# Patient Record
Sex: MALE | Race: WHITE | NOT HISPANIC OR LATINO | Employment: OTHER | URBAN - METROPOLITAN AREA
[De-identification: names, ages, dates, MRNs, and addresses within clinical notes are randomized per-mention and may not be internally consistent; named-entity substitution may affect disease eponyms.]

---

## 2017-07-18 ENCOUNTER — TELEPHONE (OUTPATIENT)
Dept: GASTROENTEROLOGY | Facility: CLINIC | Age: 57
End: 2017-07-18

## 2017-07-18 NOTE — TELEPHONE ENCOUNTER
Patient called for an appointment. I reminded him that we referred him to [facility] in December. I gave the patient the contact information for [facility] again.

## 2019-09-18 ENCOUNTER — OFFICE VISIT (OUTPATIENT)
Dept: FAMILY MEDICINE CLINIC | Facility: CLINIC | Age: 59
End: 2019-09-18

## 2019-09-18 VITALS
BODY MASS INDEX: 21.95 KG/M2 | DIASTOLIC BLOOD PRESSURE: 74 MMHG | WEIGHT: 144.8 LBS | HEART RATE: 109 BPM | SYSTOLIC BLOOD PRESSURE: 148 MMHG | HEIGHT: 68 IN | OXYGEN SATURATION: 98 % | TEMPERATURE: 98.5 F

## 2019-09-18 DIAGNOSIS — K62.89 RECTAL PAIN: Primary | ICD-10-CM

## 2019-09-18 DIAGNOSIS — I10 ESSENTIAL HYPERTENSION: ICD-10-CM

## 2019-09-18 PROCEDURE — 99214 OFFICE O/P EST MOD 30 MIN: CPT | Performed by: NURSE PRACTITIONER

## 2019-09-18 NOTE — PROGRESS NOTES
Subjective   Chay Gallo Jr. is a 59 y.o. male.     Chief Complaint   Patient presents with   • Hypertension     This is my first time seeing this patient.   Rectal Pain   The current episode started more than 1 year ago. Pertinent negatives include no change in bowel habit, chest pain, fever, nausea or vomiting. Nothing aggravates the symptoms. He has tried nothing for the symptoms.        Hypertension: Patient here for evaluation of essential hypertension. Blood pressure is not well controlled at home.He is exercising and is not adherent to low salt diet. Home monitoring: The patient is not checking blood pressure at home. Symptoms: none. Medications: The patient is not adherent with their medication regimen. Medication(s): ACE Inhibitor. Patient states he is unable to swallow tablets or capsules. The patient is due for Lipid panel and Serum creatinine.          The following portions of the patient's history were reviewed and updated as appropriate: allergies, current medications, past family history, past medical history, past social history, past surgical history and problem list.    Past Medical History:   Diagnosis Date   • Allergic rhinitis    • Anxiety    • Benign hypertension    • Bipolar disorder (CMS/HCC)    • BPH (benign prostatic hyperplasia)    • Bright red rectal bleeding    • Cigarette nicotine dependence    • Depression    • Dysuria    • Frequency of urination    • GERD (gastroesophageal reflux disease)    • Hemorrhoids    • High blood pressure    • Hypertension    • IBS (irritable bowel syndrome)    • Liver pain    • Migraines    • Prostate disease    • Sleep apnea        History reviewed. No pertinent surgical history.    Family History   Problem Relation Age of Onset   • Hypertension Mother    • Hypertension Father    • Heart disease Other         FH of heart disease in males before age 55       Social History     Socioeconomic History   • Marital status: Unknown     Spouse name: Not on file  "  • Number of children: Not on file   • Years of education: Not on file   • Highest education level: Not on file   Tobacco Use   • Smoking status: Current Every Day Smoker     Packs/day: 0.50     Types: Cigarettes   • Smokeless tobacco: Never Used   Substance and Sexual Activity   • Alcohol use: No   • Drug use: No       Review of Systems   Constitutional: Negative for fever.   Cardiovascular: Negative for chest pain and leg swelling.   Gastrointestinal: Positive for rectal pain. Negative for change in bowel habit, nausea and vomiting.       Objective   Vitals:    09/18/19 1110   BP: 148/74   BP Location: Left arm   Patient Position: Sitting   Pulse: 109   Temp: 98.5 °F (36.9 °C)   SpO2: 98%   Weight: 65.7 kg (144 lb 12.8 oz)   Height: 171.5 cm (67.5\")      Body mass index is 22.34 kg/m².  Physical Exam   Constitutional: He appears well-developed and well-nourished.   Cardiovascular: Normal rate, regular rhythm and normal heart sounds.   Pulmonary/Chest: Effort normal and breath sounds normal.   Abdominal: Soft. Bowel sounds are normal. There is no tenderness.   Genitourinary:   Genitourinary Comments: Declined    Psychiatric: He has a normal mood and affect.   Nursing note and vitals reviewed.        Assessment/Plan   Chay was seen today for hypertension.    Diagnoses and all orders for this visit:    Rectal pain  -     Ambulatory Referral to Gastroenterology    Essential hypertension  -     Comprehensive Metabolic Panel  -     Lipid Panel With / Chol / HDL Ratio  -     Lisinopril 1 MG/ML solution; Take 10 mg by mouth Daily.               "

## 2019-09-19 ENCOUNTER — TELEPHONE (OUTPATIENT)
Dept: FAMILY MEDICINE CLINIC | Facility: CLINIC | Age: 59
End: 2019-09-19

## 2019-09-19 LAB
ALBUMIN SERPL-MCNC: 5 G/DL (ref 3.5–5.2)
ALBUMIN/GLOB SERPL: 1.9 G/DL
ALP SERPL-CCNC: 65 U/L (ref 39–117)
ALT SERPL-CCNC: 11 U/L (ref 1–41)
AST SERPL-CCNC: 15 U/L (ref 1–40)
BILIRUB SERPL-MCNC: 0.5 MG/DL (ref 0.2–1.2)
BUN SERPL-MCNC: 6 MG/DL (ref 6–20)
BUN/CREAT SERPL: 6.4 (ref 7–25)
CALCIUM SERPL-MCNC: 9.7 MG/DL (ref 8.6–10.5)
CHLORIDE SERPL-SCNC: 101 MMOL/L (ref 98–107)
CHOLEST SERPL-MCNC: 188 MG/DL (ref 0–200)
CHOLEST/HDLC SERPL: 3.55 {RATIO}
CO2 SERPL-SCNC: 26.6 MMOL/L (ref 22–29)
CREAT SERPL-MCNC: 0.94 MG/DL (ref 0.76–1.27)
GLOBULIN SER CALC-MCNC: 2.6 GM/DL
GLUCOSE SERPL-MCNC: 111 MG/DL (ref 65–99)
HDLC SERPL-MCNC: 53 MG/DL (ref 40–60)
LDLC SERPL CALC-MCNC: 121 MG/DL (ref 0–100)
POTASSIUM SERPL-SCNC: 5.4 MMOL/L (ref 3.5–5.2)
PROT SERPL-MCNC: 7.6 G/DL (ref 6–8.5)
SODIUM SERPL-SCNC: 143 MMOL/L (ref 136–145)
TRIGL SERPL-MCNC: 70 MG/DL (ref 0–150)
VLDLC SERPL CALC-MCNC: 14 MG/DL

## 2019-09-19 NOTE — TELEPHONE ENCOUNTER
Spoke with patient and informed him that we have placed a referral for him to see the GI doc.  If the bleeding is getting worse he would need to go to the ER.  Patient voiced and verbalized understanding.

## 2019-09-20 ENCOUNTER — TELEPHONE (OUTPATIENT)
Dept: FAMILY MEDICINE CLINIC | Facility: CLINIC | Age: 59
End: 2019-09-20

## 2019-09-20 DIAGNOSIS — R73.9 HYPERGLYCEMIA: ICD-10-CM

## 2019-09-20 DIAGNOSIS — E78.5 HYPERLIPIDEMIA, UNSPECIFIED HYPERLIPIDEMIA TYPE: Primary | ICD-10-CM

## 2019-09-20 DIAGNOSIS — E87.5 HYPERKALEMIA: ICD-10-CM

## 2019-09-20 RX ORDER — ATORVASTATIN CALCIUM 20 MG/1
20 TABLET, FILM COATED ORAL DAILY
Qty: 90 TABLET | Refills: 0 | Status: SHIPPED | OUTPATIENT
Start: 2019-09-20

## 2019-09-20 NOTE — TELEPHONE ENCOUNTER
Since fasting status is unknown, will draw A1c in 2 weeks.  Kidney function tests are normal.  Potassium is elevated at 5.4 so will repeat potassium level in 2 weeks.  Please decrease potassium rich foods and any potassium supplementation.  If potassium level remains elevated, then may need to switch antihypertensive medication.  Liver function tests are normal. The 10-year CVD risk score (TALISHA'Teriino, et al., 2008) is: 34.2%    Values used to calculate the score:      Age: 59 years      Sex: Male      Diabetic: No      Tobacco smoker: Yes      Systolic Blood Pressure: 148 mmHg      Is BP treated: Yes      HDL Cholesterol: 53 mg/dL      Total Cholesterol: 188 mg/dL  Given 10-year CVD risk score of 34.2% would recommend starting statin therapy.    Patient aware of results and recommendations.

## 2019-09-23 ENCOUNTER — TELEPHONE (OUTPATIENT)
Dept: OTHER | Facility: OTHER | Age: 59
End: 2019-09-23

## 2019-09-23 NOTE — TELEPHONE ENCOUNTER
Patient called in regards to follow up on a potential referral. Please return call and advise.    Requested to speak with a Jena.    Please return call as soon as possible.

## 2019-09-24 NOTE — TELEPHONE ENCOUNTER
It is easiest to reach patient in the morning or later in the evening for his gastro referral & he wants a female

## 2019-09-27 ENCOUNTER — TELEPHONE (OUTPATIENT)
Dept: OTHER | Facility: OTHER | Age: 59
End: 2019-09-27

## 2019-10-01 ENCOUNTER — TELEPHONE (OUTPATIENT)
Dept: FAMILY MEDICINE CLINIC | Facility: CLINIC | Age: 59
End: 2019-10-01

## 2019-10-01 NOTE — TELEPHONE ENCOUNTER
"Please tell patient that I am not sure what he is referring to. During last visit I ordered gastroenterology for possible colonoscopy (\"c-scope\").   "

## 2019-10-01 NOTE — TELEPHONE ENCOUNTER
Patient states during his last visit you mentioned something about a heart scope and he was wondering if he was going to be set up for a appointment for that. Please call.

## 2019-10-02 NOTE — TELEPHONE ENCOUNTER
Spoke with patient and reinformed him that the apt is being set up and he will have to follow the protocol of that office if they require him to come in and do a consult first that is what has to be done.  Patient verbalized understanding.

## 2019-10-03 ENCOUNTER — RESULTS ENCOUNTER (OUTPATIENT)
Dept: FAMILY MEDICINE CLINIC | Facility: CLINIC | Age: 59
End: 2019-10-03

## 2019-10-03 DIAGNOSIS — E87.5 HYPERKALEMIA: ICD-10-CM

## 2019-10-03 DIAGNOSIS — R73.9 HYPERGLYCEMIA: ICD-10-CM

## 2019-10-08 NOTE — TELEPHONE ENCOUNTER
"Patient calling back again. Does not want colonoscopy or anything of that nature. Says he wants the \"hard scope\" and would like a call at 10:00am on 10-9-19  "

## 2019-10-10 ENCOUNTER — TELEPHONE (OUTPATIENT)
Dept: FAMILY MEDICINE CLINIC | Facility: CLINIC | Age: 59
End: 2019-10-10

## 2019-10-15 NOTE — TELEPHONE ENCOUNTER
I spoke with Gama Denisha regarding patient's behavior and recent discharge from GI. Mr. Denny reports history of patient wanting to remove his pants in front of female providers. Mr. Denny is unsure if patient truly has rectal pain and/or rectal bleeding. I explained to Mr. Denny that I will place a new GI referral to a male physician. Patient will see this physician or can seek a new PCP. Mr. Denny understands and will relay information to patient.

## 2020-02-17 ENCOUNTER — TELEPHONE (OUTPATIENT)
Dept: FAMILY MEDICINE CLINIC | Facility: CLINIC | Age: 60
End: 2020-02-17

## 2020-02-17 DIAGNOSIS — K62.89 RECTAL PAIN: Primary | ICD-10-CM

## 2020-02-17 NOTE — TELEPHONE ENCOUNTER
Pt called and requested a GI referral. Pt asked if it was going to woman, I informed him it would not be a woman. Please note he asked if you would be able to check him I told him no he could only be check by GI.

## 2020-02-26 ENCOUNTER — TELEPHONE (OUTPATIENT)
Dept: GASTROENTEROLOGY | Facility: CLINIC | Age: 60
End: 2020-02-26

## 2020-02-26 NOTE — TELEPHONE ENCOUNTER
----- Message from Sarah Winter sent at 2/26/2020 10:43 AM EST -----  Regarding: RECTAL PAIN  Contact: [phone]  CALL PATIENT THIS MORNING TO SCHEDULE HIM FOR A APPOINTMENT WITH DR. BAUTISTA. HE IS SCHEDULED FOR TOMORROW . HE WANTS A NURSE TO CALL HIM BACK REGARDING IS RECTAL PAIN. HE WAS VERY INSISTENT. PATIENT CAN BE REACHED AT THE NUMBER ABOVE.

## 2020-02-26 NOTE — TELEPHONE ENCOUNTER
Called pt and pt is asking for a call back because he is soaking in the tub.  Pt continues to ask if someone is going to check him back there.  ADvised pt that we have not seen him since 2014 and he can discuss his issues with Dr Freedman tomorrow. He is asking for address advised will give him the address when I call him back.

## 2020-02-27 ENCOUNTER — TELEPHONE (OUTPATIENT)
Dept: GASTROENTEROLOGY | Facility: CLINIC | Age: 60
End: 2020-02-27

## 2020-02-27 NOTE — TELEPHONE ENCOUNTER
"Dr Freedman came to me this AM after reviewing her schedule. This pt needs to be canceled, he has been asked not to come back to this practice and been referred to [facility]. She asked that I call and cancel his appt and if he has further questions, tell him that the  can call and discuss further. She stated the appt needed to be canceled and to be very clear that he does not need to come in today.    Called pt and advised that we need to cancel his appt for today. Advised that Dr Freedman is not his GI doctor anymore, she has referred him to another group. He states he does not know what people keep doing him this way and states fin to cancel his appt but he needs a \"lady doctor\" to see him wherever he goes because he does not want to see a \"man doctor\". He wants a call back from the  to discuss where he can go to be able to see a lady doctor to check him out back there.   "

## 2020-10-27 NOTE — TELEPHONE ENCOUNTER
Patient called and states he hadn't heard anything about the other referral that was suppose to be done.  He said he wasn't getting the colonoscopy, but there was some sort of heart scope that he thought he was going to be referred for.  I didn't see anything in his chart?  His phone number is [phone].   99

## 2022-07-26 ENCOUNTER — HOSPITAL ENCOUNTER (INPATIENT)
Facility: HOSPITAL | Age: 62
LOS: 1 days | Discharge: ANOTHER HOSPITAL | End: 2022-07-27
Attending: EMERGENCY MEDICINE | Admitting: HOSPITALIST

## 2022-07-26 ENCOUNTER — APPOINTMENT (OUTPATIENT)
Dept: GENERAL RADIOLOGY | Facility: HOSPITAL | Age: 62
End: 2022-07-26

## 2022-07-26 DIAGNOSIS — I50.9 ACUTE ON CHRONIC CONGESTIVE HEART FAILURE, UNSPECIFIED HEART FAILURE TYPE: Primary | ICD-10-CM

## 2022-07-26 DIAGNOSIS — E87.20 LACTIC ACID INCREASED: ICD-10-CM

## 2022-07-26 DIAGNOSIS — J90 PLEURAL EFFUSION: ICD-10-CM

## 2022-07-26 DIAGNOSIS — Z91.199 H/O NONCOMPLIANCE WITH MEDICAL TREATMENT, PRESENTING HAZARDS TO HEALTH: ICD-10-CM

## 2022-07-26 LAB
ALBUMIN SERPL-MCNC: 3.2 G/DL (ref 3.5–5.2)
ALBUMIN/GLOB SERPL: 0.8 G/DL
ALP SERPL-CCNC: 110 U/L (ref 39–117)
ALT SERPL W P-5'-P-CCNC: 17 U/L (ref 1–41)
ANION GAP SERPL CALCULATED.3IONS-SCNC: 15.6 MMOL/L (ref 5–15)
ARTERIAL PATENCY WRIST A: POSITIVE
AST SERPL-CCNC: 28 U/L (ref 1–40)
ATMOSPHERIC PRESS: 752.7 MMHG
BASE EXCESS BLDA CALC-SCNC: 2.2 MMOL/L (ref 0–2)
BASOPHILS # BLD AUTO: 0.02 10*3/MM3 (ref 0–0.2)
BASOPHILS NFR BLD AUTO: 0.2 % (ref 0–1.5)
BDY SITE: ABNORMAL
BILIRUB SERPL-MCNC: 3.1 MG/DL (ref 0–1.2)
BUN SERPL-MCNC: 10 MG/DL (ref 8–23)
BUN/CREAT SERPL: 10.3 (ref 7–25)
CALCIUM SPEC-SCNC: 8.3 MG/DL (ref 8.6–10.5)
CHLORIDE SERPL-SCNC: 98 MMOL/L (ref 98–107)
CO2 SERPL-SCNC: 26.4 MMOL/L (ref 22–29)
CREAT SERPL-MCNC: 0.97 MG/DL (ref 0.76–1.27)
D-LACTATE SERPL-SCNC: 3.3 MMOL/L (ref 0.5–2)
D-LACTATE SERPL-SCNC: 3.8 MMOL/L (ref 0.5–2)
D-LACTATE SERPL-SCNC: 3.8 MMOL/L (ref 0.5–2)
D-LACTATE SERPL-SCNC: 4 MMOL/L (ref 0.5–2)
DEPRECATED RDW RBC AUTO: 57.1 FL (ref 37–54)
EGFRCR SERPLBLD CKD-EPI 2021: 88.8 ML/MIN/1.73
EOSINOPHIL # BLD AUTO: 0 10*3/MM3 (ref 0–0.4)
EOSINOPHIL NFR BLD AUTO: 0 % (ref 0.3–6.2)
ERYTHROCYTE [DISTWIDTH] IN BLOOD BY AUTOMATED COUNT: 16.1 % (ref 12.3–15.4)
GLOBULIN UR ELPH-MCNC: 4 GM/DL
GLUCOSE SERPL-MCNC: 98 MG/DL (ref 65–99)
HCO3 BLDA-SCNC: 26.7 MMOL/L (ref 22–28)
HCT VFR BLD AUTO: 49.3 % (ref 37.5–51)
HGB BLD-MCNC: 15.8 G/DL (ref 13–17.7)
IMM GRANULOCYTES # BLD AUTO: 0.03 10*3/MM3 (ref 0–0.05)
IMM GRANULOCYTES NFR BLD AUTO: 0.4 % (ref 0–0.5)
INR PPP: 1.53 (ref 0.9–1.1)
LYMPHOCYTES # BLD AUTO: 0.77 10*3/MM3 (ref 0.7–3.1)
LYMPHOCYTES NFR BLD AUTO: 9 % (ref 19.6–45.3)
MAGNESIUM SERPL-MCNC: 1.8 MG/DL (ref 1.6–2.4)
MCH RBC QN AUTO: 31.2 PG (ref 26.6–33)
MCHC RBC AUTO-ENTMCNC: 32 G/DL (ref 31.5–35.7)
MCV RBC AUTO: 97.4 FL (ref 79–97)
MODALITY: ABNORMAL
MONOCYTES # BLD AUTO: 0.32 10*3/MM3 (ref 0.1–0.9)
MONOCYTES NFR BLD AUTO: 3.7 % (ref 5–12)
NEUTROPHILS NFR BLD AUTO: 7.4 10*3/MM3 (ref 1.7–7)
NEUTROPHILS NFR BLD AUTO: 86.7 % (ref 42.7–76)
NRBC BLD AUTO-RTO: 0.2 /100 WBC (ref 0–0.2)
NT-PROBNP SERPL-MCNC: ABNORMAL PG/ML (ref 0–900)
PCO2 BLDA: 40.2 MM HG (ref 35–45)
PH BLDA: 7.43 PH UNITS (ref 7.35–7.45)
PLATELET # BLD AUTO: 166 10*3/MM3 (ref 140–450)
PMV BLD AUTO: 11 FL (ref 6–12)
PO2 BLDA: 60.7 MM HG (ref 80–100)
POTASSIUM SERPL-SCNC: 3.5 MMOL/L (ref 3.5–5.2)
PROCALCITONIN SERPL-MCNC: 0.17 NG/ML (ref 0–0.25)
PROT SERPL-MCNC: 7.2 G/DL (ref 6–8.5)
PROTHROMBIN TIME: 18.1 SECONDS (ref 11.7–14.2)
QT INTERVAL: 389 MS
RBC # BLD AUTO: 5.06 10*6/MM3 (ref 4.14–5.8)
SAO2 % BLDCOA: 91.5 % (ref 92–99)
SARS-COV-2 RNA RESP QL NAA+PROBE: NOT DETECTED
SET MECH RESP RATE: 20
SODIUM SERPL-SCNC: 140 MMOL/L (ref 136–145)
TROPONIN T SERPL-MCNC: 0.01 NG/ML (ref 0–0.03)
WBC NRBC COR # BLD: 8.54 10*3/MM3 (ref 3.4–10.8)

## 2022-07-26 PROCEDURE — 36600 WITHDRAWAL OF ARTERIAL BLOOD: CPT

## 2022-07-26 PROCEDURE — 90791 PSYCH DIAGNOSTIC EVALUATION: CPT

## 2022-07-26 PROCEDURE — 87040 BLOOD CULTURE FOR BACTERIA: CPT | Performed by: EMERGENCY MEDICINE

## 2022-07-26 PROCEDURE — 25010000002 FUROSEMIDE PER 20 MG: Performed by: EMERGENCY MEDICINE

## 2022-07-26 PROCEDURE — 84145 PROCALCITONIN (PCT): CPT | Performed by: EMERGENCY MEDICINE

## 2022-07-26 PROCEDURE — 84484 ASSAY OF TROPONIN QUANT: CPT | Performed by: EMERGENCY MEDICINE

## 2022-07-26 PROCEDURE — 82803 BLOOD GASES ANY COMBINATION: CPT

## 2022-07-26 PROCEDURE — 83735 ASSAY OF MAGNESIUM: CPT | Performed by: EMERGENCY MEDICINE

## 2022-07-26 PROCEDURE — U0003 INFECTIOUS AGENT DETECTION BY NUCLEIC ACID (DNA OR RNA); SEVERE ACUTE RESPIRATORY SYNDROME CORONAVIRUS 2 (SARS-COV-2) (CORONAVIRUS DISEASE [COVID-19]), AMPLIFIED PROBE TECHNIQUE, MAKING USE OF HIGH THROUGHPUT TECHNOLOGIES AS DESCRIBED BY CMS-2020-01-R: HCPCS | Performed by: EMERGENCY MEDICINE

## 2022-07-26 PROCEDURE — 99285 EMERGENCY DEPT VISIT HI MDM: CPT

## 2022-07-26 PROCEDURE — U0005 INFEC AGEN DETEC AMPLI PROBE: HCPCS | Performed by: EMERGENCY MEDICINE

## 2022-07-26 PROCEDURE — 71045 X-RAY EXAM CHEST 1 VIEW: CPT

## 2022-07-26 PROCEDURE — 93010 ELECTROCARDIOGRAM REPORT: CPT | Performed by: INTERNAL MEDICINE

## 2022-07-26 PROCEDURE — 80053 COMPREHEN METABOLIC PANEL: CPT | Performed by: EMERGENCY MEDICINE

## 2022-07-26 PROCEDURE — 85610 PROTHROMBIN TIME: CPT | Performed by: EMERGENCY MEDICINE

## 2022-07-26 PROCEDURE — 93005 ELECTROCARDIOGRAM TRACING: CPT | Performed by: EMERGENCY MEDICINE

## 2022-07-26 PROCEDURE — 83880 ASSAY OF NATRIURETIC PEPTIDE: CPT | Performed by: EMERGENCY MEDICINE

## 2022-07-26 PROCEDURE — 25010000002 CEFTRIAXONE PER 250 MG: Performed by: EMERGENCY MEDICINE

## 2022-07-26 PROCEDURE — 36415 COLL VENOUS BLD VENIPUNCTURE: CPT | Performed by: EMERGENCY MEDICINE

## 2022-07-26 PROCEDURE — 85025 COMPLETE CBC W/AUTO DIFF WBC: CPT | Performed by: EMERGENCY MEDICINE

## 2022-07-26 PROCEDURE — 83605 ASSAY OF LACTIC ACID: CPT | Performed by: EMERGENCY MEDICINE

## 2022-07-26 RX ORDER — NITROGLYCERIN 0.4 MG/1
0.4 TABLET SUBLINGUAL
Status: DISCONTINUED | OUTPATIENT
Start: 2022-07-26 | End: 2022-07-27 | Stop reason: HOSPADM

## 2022-07-26 RX ORDER — SODIUM CHLORIDE 0.9 % (FLUSH) 0.9 %
10 SYRINGE (ML) INJECTION AS NEEDED
Status: DISCONTINUED | OUTPATIENT
Start: 2022-07-26 | End: 2022-07-27 | Stop reason: HOSPADM

## 2022-07-26 RX ORDER — FUROSEMIDE 10 MG/ML
40 INJECTION INTRAMUSCULAR; INTRAVENOUS ONCE
Status: COMPLETED | OUTPATIENT
Start: 2022-07-26 | End: 2022-07-26

## 2022-07-26 RX ORDER — ACETAMINOPHEN 650 MG/1
650 SUPPOSITORY RECTAL EVERY 4 HOURS PRN
Status: DISCONTINUED | OUTPATIENT
Start: 2022-07-26 | End: 2022-07-27 | Stop reason: HOSPADM

## 2022-07-26 RX ORDER — SODIUM CHLORIDE 0.9 % (FLUSH) 0.9 %
10 SYRINGE (ML) INJECTION EVERY 12 HOURS SCHEDULED
Status: DISCONTINUED | OUTPATIENT
Start: 2022-07-26 | End: 2022-07-27 | Stop reason: HOSPADM

## 2022-07-26 RX ORDER — ATORVASTATIN CALCIUM 20 MG/1
20 TABLET, FILM COATED ORAL DAILY
Status: DISCONTINUED | OUTPATIENT
Start: 2022-07-26 | End: 2022-07-27 | Stop reason: HOSPADM

## 2022-07-26 RX ORDER — FUROSEMIDE 10 MG/ML
40 INJECTION INTRAMUSCULAR; INTRAVENOUS EVERY 12 HOURS
Status: DISCONTINUED | OUTPATIENT
Start: 2022-07-27 | End: 2022-07-27 | Stop reason: HOSPADM

## 2022-07-26 RX ORDER — ACETAMINOPHEN 160 MG/5ML
650 SOLUTION ORAL EVERY 4 HOURS PRN
Status: DISCONTINUED | OUTPATIENT
Start: 2022-07-26 | End: 2022-07-27 | Stop reason: HOSPADM

## 2022-07-26 RX ORDER — ACETAMINOPHEN 325 MG/1
650 TABLET ORAL EVERY 4 HOURS PRN
Status: DISCONTINUED | OUTPATIENT
Start: 2022-07-26 | End: 2022-07-27 | Stop reason: HOSPADM

## 2022-07-26 RX ORDER — HYDROCODONE BITARTRATE AND ACETAMINOPHEN 5; 325 MG/1; MG/1
1 TABLET ORAL EVERY 4 HOURS PRN
Status: DISCONTINUED | OUTPATIENT
Start: 2022-07-26 | End: 2022-07-27 | Stop reason: HOSPADM

## 2022-07-26 RX ORDER — ONDANSETRON 2 MG/ML
4 INJECTION INTRAMUSCULAR; INTRAVENOUS EVERY 6 HOURS PRN
Status: DISCONTINUED | OUTPATIENT
Start: 2022-07-26 | End: 2022-07-27 | Stop reason: HOSPADM

## 2022-07-26 RX ADMIN — DOXYCYCLINE 100 MG: 100 INJECTION, POWDER, LYOPHILIZED, FOR SOLUTION INTRAVENOUS at 16:15

## 2022-07-26 RX ADMIN — Medication 10 ML: at 19:50

## 2022-07-26 RX ADMIN — ATORVASTATIN CALCIUM 20 MG: 20 TABLET, FILM COATED ORAL at 18:44

## 2022-07-26 RX ADMIN — CEFTRIAXONE 2 G: 2 INJECTION, POWDER, FOR SOLUTION INTRAMUSCULAR; INTRAVENOUS at 16:16

## 2022-07-26 RX ADMIN — FUROSEMIDE 40 MG: 10 INJECTION, SOLUTION INTRAMUSCULAR; INTRAVENOUS at 16:16

## 2022-07-26 RX ADMIN — SODIUM CHLORIDE 500 ML: 9 INJECTION, SOLUTION INTRAVENOUS at 21:42

## 2022-07-27 VITALS
BODY MASS INDEX: 24.27 KG/M2 | RESPIRATION RATE: 18 BRPM | DIASTOLIC BLOOD PRESSURE: 85 MMHG | SYSTOLIC BLOOD PRESSURE: 119 MMHG | OXYGEN SATURATION: 92 % | WEIGHT: 151 LBS | TEMPERATURE: 97.7 F | HEART RATE: 120 BPM | HEIGHT: 66 IN

## 2022-07-27 LAB — D-LACTATE SERPL-SCNC: 2.2 MMOL/L (ref 0.5–2)

## 2022-07-27 PROCEDURE — 25010000002 FUROSEMIDE PER 20 MG: Performed by: INTERNAL MEDICINE

## 2022-07-27 PROCEDURE — 83605 ASSAY OF LACTIC ACID: CPT | Performed by: EMERGENCY MEDICINE

## 2022-07-27 RX ORDER — POTASSIUM CHLORIDE 1.5 G/1.77G
20 POWDER, FOR SOLUTION ORAL ONCE
Status: COMPLETED | OUTPATIENT
Start: 2022-07-27 | End: 2022-07-27

## 2022-07-27 RX ADMIN — FUROSEMIDE 40 MG: 10 INJECTION, SOLUTION INTRAMUSCULAR; INTRAVENOUS at 11:43

## 2022-07-27 RX ADMIN — HYDROCODONE BITARTRATE AND ACETAMINOPHEN 1 TABLET: 5; 325 TABLET ORAL at 02:00

## 2022-07-27 RX ADMIN — FUROSEMIDE 40 MG: 10 INJECTION, SOLUTION INTRAMUSCULAR; INTRAVENOUS at 00:59

## 2022-07-27 RX ADMIN — ATORVASTATIN CALCIUM 20 MG: 20 TABLET, FILM COATED ORAL at 08:01

## 2022-07-27 RX ADMIN — POTASSIUM CHLORIDE 20 MEQ: 1.5 POWDER, FOR SOLUTION ORAL at 11:43

## 2022-07-27 RX ADMIN — Medication 10 ML: at 08:01

## 2022-07-31 LAB
BACTERIA SPEC AEROBE CULT: NORMAL
BACTERIA SPEC AEROBE CULT: NORMAL